# Patient Record
Sex: FEMALE | Race: WHITE | NOT HISPANIC OR LATINO | URBAN - METROPOLITAN AREA
[De-identification: names, ages, dates, MRNs, and addresses within clinical notes are randomized per-mention and may not be internally consistent; named-entity substitution may affect disease eponyms.]

---

## 2020-09-08 ENCOUNTER — OFFICE VISIT (OUTPATIENT)
Dept: URBAN - METROPOLITAN AREA CLINIC 63 | Facility: CLINIC | Age: 60
End: 2020-09-08

## 2020-09-08 ENCOUNTER — OFFICE VISIT (OUTPATIENT)
Dept: URBAN - METROPOLITAN AREA CLINIC 60 | Facility: CLINIC | Age: 60
End: 2020-09-08

## 2021-10-06 ENCOUNTER — OFFICE VISIT (OUTPATIENT)
Dept: URBAN - METROPOLITAN AREA CLINIC 60 | Facility: CLINIC | Age: 61
End: 2021-10-06

## 2022-04-04 ENCOUNTER — LAB OUTSIDE AN ENCOUNTER (OUTPATIENT)
Dept: URBAN - METROPOLITAN AREA CLINIC 121 | Facility: CLINIC | Age: 62
End: 2022-04-04

## 2022-04-06 LAB
ABSOLUTE BASOPHILS: (no result)
ABSOLUTE EOSINOPHILS: (no result)
ABSOLUTE LYMPHOCYTES: (no result)
ABSOLUTE MONOCYTES: (no result)
ABSOLUTE NEUTROPHILS: (no result)
ALBUMIN/GLOBULIN RATIO: (no result)
ALBUMIN: (no result)
ALKALINE PHOSPHATASE: (no result)
ALT: (no result)
AST: (no result)
BASOPHILS: (no result)
BILIRUBIN, TOTAL: (no result)
BUN/CREATININE RATIO: (no result)
CALCIUM: (no result)
CARBON DIOXIDE: (no result)
CHLORIDE: (no result)
CREATININE: (no result)
EGFR AFRICAN AMERIC: (no result)
EGFR NON-AFR. AMERI: (no result)
EOSINOPHILS: (no result)
GLOBULIN: (no result)
GLUCOSE: (no result)
HEMATOCRIT: (no result)
HEMOGLOBIN: (no result)
INR: 1
LYMPHOCYTES: (no result)
MCH: (no result)
MCHC: (no result)
MCV: (no result)
MONOCYTES: (no result)
MPV: (no result)
NEUTROPHILS: (no result)
PLATELET COUNT: (no result)
POTASSIUM: (no result)
PROTEIN, TOTAL: (no result)
PT: (no result)
RDW: (no result)
RED BLOOD CELL COUNT: (no result)
SODIUM: (no result)
UREA NITROGEN (BUN): (no result)
WHITE BLOOD CELL COUNT: (no result)

## 2022-04-20 ENCOUNTER — OFFICE VISIT (OUTPATIENT)
Dept: URBAN - METROPOLITAN AREA CLINIC 60 | Facility: CLINIC | Age: 62
End: 2022-04-20

## 2022-07-09 ENCOUNTER — TELEPHONE ENCOUNTER (OUTPATIENT)
Dept: URBAN - METROPOLITAN AREA CLINIC 121 | Facility: CLINIC | Age: 62
End: 2022-07-09

## 2022-07-09 RX ORDER — PANTOPRAZOLE SODIUM 40 MG/1
TABLET, DELAYED RELEASE ORAL ONCE A DAY
Refills: 0 | OUTPATIENT
Start: 2017-12-19 | End: 2017-12-19

## 2022-07-09 RX ORDER — GINGER ROOT/GINGER ROOT EXT 262.5 MG
CAPSULE ORAL
Refills: 0 | OUTPATIENT
Start: 2016-12-07 | End: 2017-06-27

## 2022-07-09 RX ORDER — SODIUM SULFATE, POTASSIUM SULFATE, MAGNESIUM SULFATE 17.5; 3.13; 1.6 G/ML; G/ML; G/ML
SOLUTION, CONCENTRATE ORAL
Refills: 0 | OUTPATIENT
Start: 2014-03-26 | End: 2018-03-14

## 2022-07-09 RX ORDER — GINGER ROOT/GINGER ROOT EXT 262.5 MG
CAPSULE ORAL ONCE A DAY
Refills: 0 | OUTPATIENT
Start: 2017-12-19 | End: 2018-03-14

## 2022-07-09 RX ORDER — CARVEDILOL 3.12 MG/1
TABLET, FILM COATED ORAL TWICE A DAY
Refills: 0 | OUTPATIENT
Start: 2017-12-19 | End: 2018-03-14

## 2022-07-09 RX ORDER — ESTRADIOL 10 UG/1
INSERT VAGINAL
Refills: 0 | OUTPATIENT
Start: 2011-05-03 | End: 2014-01-29

## 2022-07-09 RX ORDER — PANTOPRAZOLE SODIUM 40 MG/1
TABLET, DELAYED RELEASE ORAL
Refills: 0 | OUTPATIENT
Start: 2013-12-26 | End: 2014-03-26

## 2022-07-09 RX ORDER — ESTRADIOL 10 UG/1
INSERT VAGINAL
Refills: 0 | OUTPATIENT
Start: 2014-03-26 | End: 2014-06-30

## 2022-07-09 RX ORDER — FOLIC ACID 1 MG/1
TABLET ORAL
Refills: 0 | OUTPATIENT
Start: 2014-06-30 | End: 2016-05-18

## 2022-07-09 RX ORDER — PANTOPRAZOLE SODIUM 40 MG/1
TABLET, DELAYED RELEASE ORAL ONCE A DAY
Refills: 0 | OUTPATIENT
Start: 2016-12-07 | End: 2017-06-27

## 2022-07-09 RX ORDER — CALCIUM NO.38/D3/MAG/BORON ASP 500MG/15ML
LIQUID (ML) ORAL ONCE A DAY
Refills: 0 | OUTPATIENT
Start: 2016-12-07 | End: 2016-12-07

## 2022-07-09 RX ORDER — PANTOPRAZOLE SODIUM 40 MG/1
TABLET, DELAYED RELEASE ORAL
Refills: 0 | OUTPATIENT
Start: 2014-03-26 | End: 2014-06-30

## 2022-07-09 RX ORDER — FOLIC ACID 1 MG/1
TABLET ORAL
Refills: 0 | OUTPATIENT
Start: 2014-01-02 | End: 2014-03-26

## 2022-07-09 RX ORDER — BENAZEPRIL HYDROCHLORIDE 10 MG/1
TABLET, COATED ORAL
Refills: 0 | OUTPATIENT
Start: 2014-03-26 | End: 2014-03-26

## 2022-07-09 RX ORDER — AMITRIPTYLINE HYDROCHLORIDE 25 MG/1
TABLET, FILM COATED ORAL
Refills: 0 | OUTPATIENT
Start: 2014-01-24 | End: 2014-03-26

## 2022-07-09 RX ORDER — BENAZEPRIL HYDROCHLORIDE 20 MG/1
TABLET, FILM COATED ORAL
Refills: 0 | OUTPATIENT
Start: 2014-06-05 | End: 2015-07-29

## 2022-07-09 RX ORDER — CARVEDILOL 3.12 MG/1
TABLET, FILM COATED ORAL ONCE A DAY
Refills: 0 | OUTPATIENT
Start: 2016-05-18 | End: 2016-12-07

## 2022-07-09 RX ORDER — SPIRONOLACTONE 25 MG/1
TABLET ORAL ONCE A DAY
Refills: 0 | OUTPATIENT
Start: 2015-07-29 | End: 2016-12-07

## 2022-07-09 RX ORDER — AMOXICILLIN 500 MG/1
CAPSULE ORAL
Refills: 0 | OUTPATIENT
Start: 2017-12-19 | End: 2018-03-14

## 2022-07-09 RX ORDER — FOLIC ACID 1 MG/1
TABLET ORAL
Refills: 0 | OUTPATIENT
Start: 2011-05-03 | End: 2014-01-29

## 2022-07-09 RX ORDER — AMITRIPTYLINE HYDROCHLORIDE 25 MG/1
TABLET, FILM COATED ORAL
Refills: 0 | OUTPATIENT
Start: 2017-12-19 | End: 2018-03-14

## 2022-07-09 RX ORDER — PANTOPRAZOLE SODIUM 40 MG/1
TABLET, DELAYED RELEASE ORAL ONCE A DAY
Refills: 0 | OUTPATIENT
Start: 2017-06-27 | End: 2017-12-19

## 2022-07-09 RX ORDER — LISINOPRIL 2.5 MG/1
TABLET ORAL
Refills: 0 | OUTPATIENT
Start: 2017-06-27 | End: 2017-12-19

## 2022-07-09 RX ORDER — FOLIC ACID 1 MG/1
TABLET ORAL
Refills: 0 | OUTPATIENT
Start: 2014-03-26 | End: 2014-06-30

## 2022-07-09 RX ORDER — AMITRIPTYLINE HYDROCHLORIDE 25 MG/1
TABLET, FILM COATED ORAL
Refills: 0 | OUTPATIENT
Start: 2017-04-03 | End: 2017-06-27

## 2022-07-09 RX ORDER — FERROUS SULFATE TAB EC 325 MG (65 MG FE EQUIVALENT) 325 (65 FE) MG
TABLET DELAYED RESPONSE ORAL
Refills: 0 | OUTPATIENT
Start: 2011-05-03 | End: 2014-01-29

## 2022-07-09 RX ORDER — CARVEDILOL 3.12 MG/1
TABLET, FILM COATED ORAL ONCE A DAY
Refills: 0 | OUTPATIENT
Start: 2015-07-29 | End: 2016-05-18

## 2022-07-09 RX ORDER — BENAZEPRIL HYDROCHLORIDE AND HYDROCHLOROTHIAZIDE 20; 12.5 MG/1; MG/1
TABLET, FILM COATED ORAL
Refills: 0 | OUTPATIENT
Start: 2011-05-03 | End: 2012-02-08

## 2022-07-09 RX ORDER — BENAZEPRIL HYDROCHLORIDE 10 MG/1
TABLET, COATED ORAL
Refills: 0 | OUTPATIENT
Start: 2014-03-26 | End: 2014-06-30

## 2022-07-09 RX ORDER — ALENDRONATE SODIUM 70 MG/1
ONCE A WEEK TABLET ORAL
Refills: 0 | OUTPATIENT
Start: 2016-12-07 | End: 2017-06-27

## 2022-07-09 RX ORDER — PANTOPRAZOLE SODIUM 40 MG/1
TABLET, DELAYED RELEASE ORAL ONCE A DAY
Refills: 0 | OUTPATIENT
Start: 2016-05-18 | End: 2016-12-07

## 2022-07-09 RX ORDER — AMITRIPTYLINE HYDROCHLORIDE 25 MG/1
TABLET, FILM COATED ORAL AS NEEDED
Refills: 0 | OUTPATIENT
Start: 2017-06-27 | End: 2017-12-19

## 2022-07-09 RX ORDER — PANTOPRAZOLE SODIUM 40 MG/1
TABLET, DELAYED RELEASE ORAL
Refills: 0 | OUTPATIENT
Start: 2014-06-30 | End: 2016-05-18

## 2022-07-09 RX ORDER — ESTRADIOL 10 UG/1
INSERT VAGINAL
Refills: 0 | OUTPATIENT
Start: 2014-06-30 | End: 2016-12-07

## 2022-07-09 RX ORDER — LEVOTHYROXINE SODIUM 25 UG/1
TABLET ORAL ONCE A DAY
Refills: 0 | OUTPATIENT
Start: 2017-06-27 | End: 2017-12-19

## 2022-07-09 RX ORDER — FUROSEMIDE 20 MG/1
TABLET ORAL
Refills: 0 | OUTPATIENT
Start: 2014-03-26 | End: 2014-06-30

## 2022-07-09 RX ORDER — GABAPENTIN 300 MG/1
CAPSULE ORAL TWICE A DAY
Refills: 0 | OUTPATIENT
Start: 2016-05-18 | End: 2016-12-07

## 2022-07-09 RX ORDER — AMOXICILLIN 500 MG/1
CAPSULE ORAL
Refills: 0 | OUTPATIENT
Start: 2017-06-27 | End: 2017-12-19

## 2022-07-09 RX ORDER — AMITRIPTYLINE HYDROCHLORIDE 25 MG/1
TABLET, FILM COATED ORAL
Refills: 0 | OUTPATIENT
Start: 2014-06-30 | End: 2015-07-29

## 2022-07-09 RX ORDER — CARVEDILOL 3.12 MG/1
TABLET, FILM COATED ORAL TWICE A DAY
Refills: 0 | OUTPATIENT
Start: 2017-06-27 | End: 2017-12-19

## 2022-07-09 RX ORDER — AMITRIPTYLINE HYDROCHLORIDE 25 MG/1
TABLET, FILM COATED ORAL
Refills: 0 | OUTPATIENT
Start: 2014-03-26 | End: 2014-06-30

## 2022-07-09 RX ORDER — LISINOPRIL 2.5 MG/1
TABLET ORAL
Refills: 0 | OUTPATIENT
Start: 2016-12-07 | End: 2017-06-27

## 2022-07-09 RX ORDER — CALCIUM NO.38/D3/MAG/BORON ASP 500MG/15ML
LIQUID (ML) ORAL
Refills: 0 | OUTPATIENT
Start: 2011-05-03 | End: 2014-01-29

## 2022-07-09 RX ORDER — PAROXETINE HYDROCHLORIDE HEMIHYDRATE 20 MG/1
TABLET, FILM COATED ORAL ONCE A DAY
Refills: 0 | OUTPATIENT
Start: 2015-07-29 | End: 2016-12-07

## 2022-07-09 RX ORDER — ALENDRONATE SODIUM 70 MG/1
ONCE A WK TABLET ORAL
Refills: 0 | OUTPATIENT
Start: 2017-06-27 | End: 2017-12-19

## 2022-07-09 RX ORDER — SERTRALINE 100 MG/1
TABLET, FILM COATED ORAL TWICE A DAY
Refills: 0 | OUTPATIENT
Start: 2016-05-18 | End: 2016-12-07

## 2022-07-09 RX ORDER — LISINOPRIL 2.5 MG/1
TABLET ORAL TAKE AS DIRECTED
Refills: 0 | OUTPATIENT
Start: 2015-07-29 | End: 2016-05-18

## 2022-07-09 RX ORDER — CALCIUM NO.38/D3/MAG/BORON ASP 500MG/15ML
LIQUID (ML) ORAL
Refills: 0 | OUTPATIENT
Start: 2014-06-30 | End: 2016-05-18

## 2022-07-09 RX ORDER — GINGER ROOT/GINGER ROOT EXT 262.5 MG
CAPSULE ORAL ONCE A DAY
Refills: 0 | OUTPATIENT
Start: 2017-06-27 | End: 2017-12-19

## 2022-07-09 RX ORDER — FOLIC ACID 1 MG/1
TABLET ORAL ONCE A DAY
Refills: 0 | OUTPATIENT
Start: 2016-05-18 | End: 2016-12-07

## 2022-07-09 RX ORDER — LISINOPRIL 2.5 MG/1
TABLET ORAL
Refills: 0 | OUTPATIENT
Start: 2016-05-18 | End: 2016-12-07

## 2022-07-09 RX ORDER — MULTIVIT WITH CALCIUM,IRON,MIN
TABLET ORAL
Refills: 0 | OUTPATIENT
Start: 2011-05-03 | End: 2014-01-29

## 2022-07-09 RX ORDER — BENAZEPRIL HYDROCHLORIDE 10 MG/1
TABLET, COATED ORAL
Refills: 0 | OUTPATIENT
Start: 2014-01-02 | End: 2014-03-26

## 2022-07-09 RX ORDER — CALCIUM NO.38/D3/MAG/BORON ASP 500MG/15ML
LIQUID (ML) ORAL
Refills: 0 | OUTPATIENT
Start: 2014-03-26 | End: 2014-06-30

## 2022-07-09 RX ORDER — ALENDRONATE SODIUM 70 MG/1
ONCE A WK TABLET ORAL
Refills: 0 | OUTPATIENT
Start: 2017-12-19 | End: 2018-03-14

## 2022-07-09 RX ORDER — CALCIUM NO.38/D3/MAG/BORON ASP 500MG/15ML
LIQUID (ML) ORAL
Refills: 0 | OUTPATIENT
Start: 2014-01-29 | End: 2014-03-26

## 2022-07-09 RX ORDER — CARVEDILOL 3.12 MG/1
TABLET, FILM COATED ORAL ONCE A DAY
Refills: 0 | OUTPATIENT
Start: 2016-12-07 | End: 2017-06-27

## 2022-07-09 RX ORDER — LEVOTHYROXINE SODIUM 25 UG/1
TABLET ORAL
Refills: 0 | OUTPATIENT
Start: 2016-12-07 | End: 2017-06-27

## 2022-07-09 RX ORDER — LEVOTHYROXINE SODIUM 50 UG/1
TABLET ORAL ONCE A DAY
Refills: 0 | OUTPATIENT
Start: 2017-12-19 | End: 2018-03-14

## 2022-07-09 RX ORDER — LISINOPRIL 2.5 MG/1
TABLET ORAL ONCE A DAY
Refills: 0 | OUTPATIENT
Start: 2017-12-19 | End: 2018-03-14

## 2022-07-09 RX ORDER — FUROSEMIDE 20 MG/1
TABLET ORAL ONCE A DAY
Refills: 0 | OUTPATIENT
Start: 2015-07-29 | End: 2016-12-07

## 2022-07-09 RX ORDER — CALCIUM NO.38/D3/MAG/BORON ASP 500MG/15ML
LIQUID (ML) ORAL ONCE A DAY
Refills: 0 | OUTPATIENT
Start: 2016-05-18 | End: 2016-12-07

## 2022-07-09 RX ORDER — FUROSEMIDE 20 MG/1
TABLET ORAL
Refills: 0 | OUTPATIENT
Start: 2014-01-18 | End: 2014-03-26

## 2022-07-09 RX ORDER — MULTIVITAMIN
TABLET ORAL ONCE A DAY
Refills: 0 | OUTPATIENT
Start: 2016-05-18 | End: 2016-12-07

## 2022-07-09 RX ORDER — LORAZEPAM 1 MG/1
TABLET ORAL
Refills: 0 | OUTPATIENT
Start: 2014-01-11 | End: 2014-01-29

## 2022-07-09 RX ORDER — ESTRADIOL 10 UG/1
INSERT VAGINAL
Refills: 0 | OUTPATIENT
Start: 2014-01-29 | End: 2014-03-26

## 2022-07-10 ENCOUNTER — TELEPHONE ENCOUNTER (OUTPATIENT)
Dept: URBAN - METROPOLITAN AREA CLINIC 121 | Facility: CLINIC | Age: 62
End: 2022-07-10

## 2022-07-10 RX ORDER — GINGER ROOT/GINGER ROOT EXT 262.5 MG
CAPSULE ORAL ONCE A DAY
Refills: 0 | Status: ACTIVE | COMMUNITY
Start: 2018-03-14

## 2022-07-10 RX ORDER — AMITRIPTYLINE HYDROCHLORIDE 25 MG/1
TABLET, FILM COATED ORAL
Refills: 0 | Status: ACTIVE | COMMUNITY
Start: 2018-03-14

## 2022-07-10 RX ORDER — LISINOPRIL 2.5 MG/1
TABLET ORAL ONCE A DAY
Refills: 0 | Status: ACTIVE | COMMUNITY
Start: 2018-03-14

## 2022-07-10 RX ORDER — AMITRIPTYLINE HYDROCHLORIDE 25 MG/1
TABLET, FILM COATED ORAL
Refills: 0 | Status: ACTIVE | COMMUNITY
Start: 2019-08-14

## 2022-07-10 RX ORDER — CARVEDILOL 3.12 MG/1
TABLET, FILM COATED ORAL TWICE A DAY
Refills: 0 | Status: ACTIVE | COMMUNITY
Start: 2018-03-14

## 2022-07-10 RX ORDER — ALENDRONATE SODIUM 70 MG/1
ONCE A WK TABLET ORAL
Refills: 0 | Status: ACTIVE | COMMUNITY
Start: 2018-03-14

## 2022-07-10 RX ORDER — LEVOTHYROXINE SODIUM 50 UG/1
TABLET ORAL ONCE A DAY
Refills: 0 | Status: ACTIVE | COMMUNITY
Start: 2018-03-14

## 2022-07-10 RX ORDER — ONDANSETRON 4 MG/1
USE AS DIRECTED ONE TABLET BEFORE EACH DOSE OF LAXATIVE FOR BOWEL PREP TABLET, ORALLY DISINTEGRATING ORAL
Refills: 0 | Status: ACTIVE | COMMUNITY
Start: 2019-08-14

## 2022-07-10 RX ORDER — FOLIC ACID 1 MG/1
TABLET ORAL
Refills: 0 | Status: ACTIVE | COMMUNITY
Start: 2019-08-14

## 2022-07-10 RX ORDER — AMOXICILLIN 500 MG/1
CAPSULE ORAL
Refills: 0 | Status: ACTIVE | COMMUNITY
Start: 2018-03-14

## 2022-08-29 ENCOUNTER — TELEPHONE ENCOUNTER (OUTPATIENT)
Dept: URBAN - METROPOLITAN AREA CLINIC 23 | Facility: CLINIC | Age: 62
End: 2022-08-29

## 2022-10-26 ENCOUNTER — OFFICE VISIT (OUTPATIENT)
Dept: URBAN - METROPOLITAN AREA CLINIC 60 | Facility: CLINIC | Age: 62
End: 2022-10-26

## 2022-11-23 ENCOUNTER — LAB OUTSIDE AN ENCOUNTER (OUTPATIENT)
Dept: URBAN - METROPOLITAN AREA CLINIC 63 | Facility: CLINIC | Age: 62
End: 2022-11-23

## 2022-11-24 LAB
A/G RATIO: 1.4
ABSOLUTE BASOPHILS: 69
ABSOLUTE EOSINOPHILS: 40
ABSOLUTE LYMPHOCYTES: 739
ABSOLUTE MONOCYTES: 469
ABSOLUTE NEUTROPHILS: 1983
ALBUMIN: 4.3
ALKALINE PHOSPHATASE: 310
ALT (SGPT): 38
AST (SGOT): 97
BASOPHILS: 2.1
BILIRUBIN, TOTAL: 1.1
BUN/CREATININE RATIO: (no result)
BUN: 10
CALCIUM: 9.3
CARBON DIOXIDE, TOTAL: 29
CHLORIDE: 99
COMMENT(S): (no result)
CREATININE: 0.55
EGFR: 102
EOSINOPHILS: 1.2
FERRITIN, SERUM: 460
FOLATE (FOLIC ACID), SERUM: >24
GLOBULIN, TOTAL: 3.1
GLUCOSE: 122
HEMATOCRIT: 34.3
HEMOGLOBIN: 11.8
IRON BIND.CAP.(TIBC): 292
IRON SATURATION: 58
IRON: 168
LYMPHOCYTES: 22.4
MCH: 36.1
MCHC: 34.4
MCV: 104.9
MONOCYTES: 14.2
MPV: 10.6
NEUTROPHILS: 60.1
PLATELET COUNT: 115
POTASSIUM: 4.5
PROTEIN, TOTAL: 7.4
RDW: 11.4
RED BLOOD CELL COUNT: 3.27
SODIUM: 137
VITAMIN B12: 629
WHITE BLOOD CELL COUNT: 3.3

## 2022-11-29 ENCOUNTER — TELEPHONE ENCOUNTER (OUTPATIENT)
Dept: URBAN - METROPOLITAN AREA CLINIC 63 | Facility: CLINIC | Age: 62
End: 2022-11-29

## 2022-11-30 ENCOUNTER — OFFICE VISIT (OUTPATIENT)
Dept: URBAN - METROPOLITAN AREA CLINIC 60 | Facility: CLINIC | Age: 62
End: 2022-11-30
Payer: MEDICARE

## 2022-11-30 VITALS
HEART RATE: 83 BPM | WEIGHT: 117.2 LBS | DIASTOLIC BLOOD PRESSURE: 60 MMHG | SYSTOLIC BLOOD PRESSURE: 114 MMHG | BODY MASS INDEX: 18.84 KG/M2 | HEIGHT: 66 IN | TEMPERATURE: 97.2 F | OXYGEN SATURATION: 98 %

## 2022-11-30 DIAGNOSIS — F10.20 UNCOMPLICATED ALCOHOL DEPENDENCE: ICD-10-CM

## 2022-11-30 DIAGNOSIS — K70.30 ALCOHOLIC CIRRHOSIS OF LIVER WITHOUT ASCITES: ICD-10-CM

## 2022-11-30 PROBLEM — 66590003: Status: ACTIVE | Noted: 2022-11-30

## 2022-11-30 PROCEDURE — 99214 OFFICE O/P EST MOD 30 MIN: CPT | Performed by: INTERNAL MEDICINE

## 2022-11-30 RX ORDER — LEVOTHYROXINE SODIUM 50 UG/1
TABLET ORAL ONCE A DAY
Refills: 0 | COMMUNITY
Start: 2018-03-14

## 2022-11-30 RX ORDER — CARVEDILOL 3.12 MG/1
TABLET, FILM COATED ORAL TWICE A DAY
Refills: 0 | COMMUNITY
Start: 2018-03-14

## 2022-11-30 RX ORDER — GINGER ROOT/GINGER ROOT EXT 262.5 MG
CAPSULE ORAL ONCE A DAY
Refills: 0 | COMMUNITY
Start: 2018-03-14

## 2022-11-30 RX ORDER — ALENDRONATE SODIUM 70 MG/1
ONCE A WK TABLET ORAL
Refills: 0 | COMMUNITY
Start: 2018-03-14

## 2022-11-30 RX ORDER — AMITRIPTYLINE HYDROCHLORIDE 25 MG/1
TABLET, FILM COATED ORAL
Refills: 0 | COMMUNITY
Start: 2019-08-14

## 2022-11-30 RX ORDER — FOLIC ACID 1 MG/1
TABLET ORAL
Refills: 0 | COMMUNITY
Start: 2019-08-14

## 2022-11-30 RX ORDER — LISINOPRIL 2.5 MG/1
TABLET ORAL ONCE A DAY
Refills: 0 | COMMUNITY
Start: 2018-03-14

## 2022-11-30 NOTE — HPI-HPI
Pt f/u for etoh probably early cirrhosis and history of etoh pancreatitis. STill drinking against my advice. Recent fibroscan 10/22 S3, F4 worsened. Previous Fibroscan S3, F0-2 dating to 2020. No recent AFP or HHC testing.  Will do triple phase CT. Labs below show AST>ALT consistent with continued etoh abuse up to 1 1/2 liters of wine per day (although she says 3 glasses of wine per day).

## 2022-11-30 NOTE — PHYSICAL EXAM NECK/THYROID:
normal appearance , no deformities , trachea midline , no masses , no JVD , no cervical lymphadenopathy, No carotid bruits Well developed

## 2022-12-07 ENCOUNTER — LAB OUTSIDE AN ENCOUNTER (OUTPATIENT)
Dept: URBAN - METROPOLITAN AREA CLINIC 60 | Facility: CLINIC | Age: 62
End: 2022-12-07

## 2022-12-24 LAB
AFP, SERUM, TUMOR MARKER: 7.2
HEREDITARY HEMOCHROMATOSIS DNA MUT: (no result)

## 2023-01-04 ENCOUNTER — OFFICE VISIT (OUTPATIENT)
Dept: URBAN - METROPOLITAN AREA CLINIC 60 | Facility: CLINIC | Age: 63
End: 2023-01-04
Payer: MEDICARE

## 2023-01-04 VITALS
HEIGHT: 66 IN | DIASTOLIC BLOOD PRESSURE: 76 MMHG | HEART RATE: 77 BPM | BODY MASS INDEX: 18.71 KG/M2 | WEIGHT: 116.4 LBS | TEMPERATURE: 97.8 F | OXYGEN SATURATION: 97 % | SYSTOLIC BLOOD PRESSURE: 122 MMHG

## 2023-01-04 DIAGNOSIS — D69.6 THROMBOCYTOPENIA: ICD-10-CM

## 2023-01-04 DIAGNOSIS — K70.0 ALCOHOLIC FATTY LIVER: ICD-10-CM

## 2023-01-04 DIAGNOSIS — K70.30 ALCOHOLIC CIRRHOSIS OF LIVER WITHOUT ASCITES: ICD-10-CM

## 2023-01-04 DIAGNOSIS — D72.819 LEUKOPENIA, UNSPECIFIED TYPE: ICD-10-CM

## 2023-01-04 PROBLEM — 84828003: Status: ACTIVE | Noted: 2023-01-04

## 2023-01-04 PROBLEM — 302215000: Status: ACTIVE | Noted: 2023-01-04

## 2023-01-04 PROCEDURE — 99214 OFFICE O/P EST MOD 30 MIN: CPT | Performed by: NURSE PRACTITIONER

## 2023-01-04 RX ORDER — ALENDRONATE SODIUM 70 MG/1
ONCE A WK TABLET ORAL
Refills: 0 | COMMUNITY
Start: 2018-03-14

## 2023-01-04 RX ORDER — AMITRIPTYLINE HYDROCHLORIDE 25 MG/1
TABLET, FILM COATED ORAL
Refills: 0 | COMMUNITY
Start: 2019-08-14

## 2023-01-04 RX ORDER — LISINOPRIL 2.5 MG/1
TABLET ORAL ONCE A DAY
Refills: 0 | COMMUNITY
Start: 2018-03-14

## 2023-01-04 RX ORDER — FOLIC ACID 1 MG/1
TABLET ORAL
Refills: 0 | COMMUNITY
Start: 2019-08-14

## 2023-01-04 RX ORDER — GINGER ROOT/GINGER ROOT EXT 262.5 MG
CAPSULE ORAL ONCE A DAY
Refills: 0 | COMMUNITY
Start: 2018-03-14

## 2023-01-04 RX ORDER — LEVOTHYROXINE SODIUM 50 UG/1
TABLET ORAL ONCE A DAY
Refills: 0 | COMMUNITY
Start: 2018-03-14

## 2023-01-04 RX ORDER — CARVEDILOL 3.12 MG/1
TABLET, FILM COATED ORAL TWICE A DAY
Refills: 0 | COMMUNITY
Start: 2018-03-14

## 2023-01-04 NOTE — HPI-CIRRHOSIS
Complications: thrombocytopenia, leukopenia and macrocytosis  Last EGD:   HCC screenin2022 AFP 7.2 2022 ct triple phase shows severe steatosis, widening of interlobar fissure. No maciej cirrhosis. Also notes chronic pancreatitis  Diuretics: none  Alcohol status: drinking 1.5 liter of white wine per day  MELD-Na: 8

## 2023-01-04 NOTE — HPI-TODAY'S VISIT:
History of severe alcohol abuse. Drinking at least 1.5 liters of wine per day, which she refers to as 3 glasses of wine. Has tried AA and inpatient rehab before to no avail. Fibroscan this year showed F4, ct triple phase shows severe steatosis and widening of interlobar fissure. Labs show pancytopenia and macrocytosis, likely bone marrow suppression from alcohol abuse. Taking multivitamin and b vitamin complex Eats 1 meal per day and drinks boost

## 2023-02-16 ENCOUNTER — OFFICE VISIT (OUTPATIENT)
Dept: URBAN - METROPOLITAN AREA CLINIC 63 | Facility: CLINIC | Age: 63
End: 2023-02-16

## 2023-02-16 RX ORDER — AMITRIPTYLINE HYDROCHLORIDE 25 MG/1
TABLET, FILM COATED ORAL
Refills: 0 | Status: ACTIVE | COMMUNITY
Start: 2019-08-14

## 2023-02-16 RX ORDER — FOLIC ACID 1 MG/1
TABLET ORAL
Refills: 0 | Status: ACTIVE | COMMUNITY
Start: 2019-08-14

## 2023-02-16 RX ORDER — GINGER ROOT/GINGER ROOT EXT 262.5 MG
CAPSULE ORAL ONCE A DAY
Refills: 0 | Status: ACTIVE | COMMUNITY
Start: 2018-03-14

## 2023-02-16 RX ORDER — CARVEDILOL 3.12 MG/1
TABLET, FILM COATED ORAL TWICE A DAY
Refills: 0 | Status: ACTIVE | COMMUNITY
Start: 2018-03-14

## 2023-02-16 RX ORDER — LEVOTHYROXINE SODIUM 50 UG/1
TABLET ORAL ONCE A DAY
Refills: 0 | Status: ACTIVE | COMMUNITY
Start: 2018-03-14

## 2023-02-16 RX ORDER — ALENDRONATE SODIUM 70 MG/1
ONCE A WK TABLET ORAL
Refills: 0 | Status: ACTIVE | COMMUNITY
Start: 2018-03-14

## 2023-02-16 RX ORDER — LISINOPRIL 2.5 MG/1
TABLET ORAL ONCE A DAY
Refills: 0 | Status: ACTIVE | COMMUNITY
Start: 2018-03-14

## 2023-03-29 ENCOUNTER — WEB ENCOUNTER (OUTPATIENT)
Dept: URBAN - METROPOLITAN AREA CLINIC 60 | Facility: CLINIC | Age: 63
End: 2023-03-29

## 2023-03-29 ENCOUNTER — LAB OUTSIDE AN ENCOUNTER (OUTPATIENT)
Dept: URBAN - METROPOLITAN AREA CLINIC 60 | Facility: CLINIC | Age: 63
End: 2023-03-29

## 2023-03-29 ENCOUNTER — OFFICE VISIT (OUTPATIENT)
Dept: URBAN - METROPOLITAN AREA CLINIC 60 | Facility: CLINIC | Age: 63
End: 2023-03-29
Payer: MEDICARE

## 2023-03-29 VITALS
HEART RATE: 76 BPM | WEIGHT: 118.2 LBS | TEMPERATURE: 98.1 F | SYSTOLIC BLOOD PRESSURE: 124 MMHG | OXYGEN SATURATION: 96 % | BODY MASS INDEX: 18.99 KG/M2 | DIASTOLIC BLOOD PRESSURE: 78 MMHG | HEIGHT: 66 IN

## 2023-03-29 DIAGNOSIS — K70.9 ALCOHOLIC LIVER DISEASE: ICD-10-CM

## 2023-03-29 PROBLEM — 41309000: Status: ACTIVE | Noted: 2023-03-29

## 2023-03-29 PROCEDURE — 99214 OFFICE O/P EST MOD 30 MIN: CPT | Performed by: INTERNAL MEDICINE

## 2023-03-29 RX ORDER — LISINOPRIL 2.5 MG/1
TAKE 1 TABLET BY MOUTH EVERY DAY TABLET ORAL
Qty: 90 EACH | Refills: 0 | Status: ACTIVE | COMMUNITY

## 2023-03-29 RX ORDER — LEVOTHYROXINE SODIUM 0.05 MG/1
TABLET ORAL
Qty: 90 TABLET | Refills: 0 | Status: ACTIVE | COMMUNITY

## 2023-03-29 RX ORDER — ALENDRONATE SODIUM 70 MG/1
TAKE 1 TABLET BY MOUTH ONCE WEEKLY TABLET ORAL
Qty: 12 EACH | Refills: 2 | Status: ACTIVE | COMMUNITY

## 2023-03-29 RX ORDER — CARVEDILOL 3.12 MG/1
TAKE 1 TABLET BY MOUTH TWICE DAILY WITH MEALS TABLET, FILM COATED ORAL
Qty: 180 EACH | Refills: 3 | Status: ACTIVE | COMMUNITY

## 2023-03-29 RX ORDER — AMITRIPTYLINE HYDROCHLORIDE 25 MG/1
TAKE 1 TO 2 TABLETS BY MOUTH AT BEDTIME TABLET, FILM COATED ORAL
Qty: 180 EACH | Refills: 2 | Status: ACTIVE | COMMUNITY

## 2023-03-29 NOTE — HPI-TODAY'S VISIT:
This is a 62-year-old female with alcoholic liver disease with advanced fibrosis and may be early cirrhotic changes coming in for follow-up.  She does not have any ascites, hepatic encephalopathy.  Last MRI in November of this year did not show cirrhosis but did show advanced liver disease.  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  Was drinking 1-1/2 L of wine every day and is now to 750 mL of wine on an every day basis which adds to 5 glasses.  She is trying to cut down more on that.  Has tried AA in the past but did not help her much.

## 2023-04-04 ENCOUNTER — TELEPHONE ENCOUNTER (OUTPATIENT)
Dept: URBAN - METROPOLITAN AREA CLINIC 63 | Facility: CLINIC | Age: 63
End: 2023-04-04

## 2023-05-04 ENCOUNTER — TELEPHONE ENCOUNTER (OUTPATIENT)
Dept: URBAN - METROPOLITAN AREA CLINIC 64 | Facility: CLINIC | Age: 63
End: 2023-05-04

## 2023-05-08 ENCOUNTER — OFFICE VISIT (OUTPATIENT)
Dept: URBAN - METROPOLITAN AREA MEDICAL CENTER 3 | Facility: MEDICAL CENTER | Age: 63
End: 2023-05-08

## 2023-07-03 ENCOUNTER — TELEPHONE ENCOUNTER (OUTPATIENT)
Dept: URBAN - METROPOLITAN AREA CLINIC 60 | Facility: CLINIC | Age: 63
End: 2023-07-03

## 2023-07-03 ENCOUNTER — TELEPHONE ENCOUNTER (OUTPATIENT)
Dept: URBAN - METROPOLITAN AREA CLINIC 63 | Facility: CLINIC | Age: 63
End: 2023-07-03

## 2023-07-03 ENCOUNTER — LAB OUTSIDE AN ENCOUNTER (OUTPATIENT)
Dept: URBAN - METROPOLITAN AREA CLINIC 63 | Facility: CLINIC | Age: 63
End: 2023-07-03

## 2023-07-03 ENCOUNTER — WEB ENCOUNTER (OUTPATIENT)
Dept: URBAN - METROPOLITAN AREA CLINIC 63 | Facility: CLINIC | Age: 63
End: 2023-07-03

## 2023-07-03 ENCOUNTER — OFFICE VISIT (OUTPATIENT)
Dept: URBAN - METROPOLITAN AREA MEDICAL CENTER 3 | Facility: MEDICAL CENTER | Age: 63
End: 2023-07-03
Payer: MEDICARE

## 2023-07-03 DIAGNOSIS — K22.10 ESOPHAGEAL ULCER: ICD-10-CM

## 2023-07-03 DIAGNOSIS — I85.00 ESOPHAGEAL VARICES: ICD-10-CM

## 2023-07-03 DIAGNOSIS — K20.90 ESOPHAGITIS: ICD-10-CM

## 2023-07-03 DIAGNOSIS — K25.9 GASTRIC ULCER: ICD-10-CM

## 2023-07-03 PROBLEM — 19943007: Status: ACTIVE | Noted: 2023-07-03

## 2023-07-03 PROCEDURE — 43239 EGD BIOPSY SINGLE/MULTIPLE: CPT | Performed by: INTERNAL MEDICINE

## 2023-07-03 RX ORDER — AMITRIPTYLINE HYDROCHLORIDE 25 MG/1
TAKE 1 TO 2 TABLETS BY MOUTH AT BEDTIME TABLET, FILM COATED ORAL
Qty: 180 EACH | Refills: 2 | Status: ACTIVE | COMMUNITY

## 2023-07-03 RX ORDER — LISINOPRIL 2.5 MG/1
TAKE 1 TABLET BY MOUTH EVERY DAY TABLET ORAL
Qty: 90 EACH | Refills: 0 | Status: ACTIVE | COMMUNITY

## 2023-07-03 RX ORDER — CARVEDILOL 3.12 MG/1
TAKE 1 TABLET BY MOUTH TWICE DAILY WITH MEALS TABLET, FILM COATED ORAL
Qty: 180 EACH | Refills: 3 | Status: ACTIVE | COMMUNITY

## 2023-07-03 RX ORDER — OMEPRAZOLE 40 MG/1
1 CAPSULE 30 MINUTES CAPSULE, DELAYED RELEASE ORAL TWICE A DAY
Qty: 60 | Refills: 3 | OUTPATIENT
Start: 2023-07-03

## 2023-07-03 RX ORDER — ALENDRONATE SODIUM 70 MG/1
TAKE 1 TABLET BY MOUTH ONCE WEEKLY TABLET ORAL
Qty: 12 EACH | Refills: 2 | Status: ACTIVE | COMMUNITY

## 2023-07-03 RX ORDER — LEVOTHYROXINE SODIUM 0.05 MG/1
TABLET ORAL
Qty: 90 TABLET | Refills: 0 | Status: ACTIVE | COMMUNITY

## 2023-07-31 ENCOUNTER — OFFICE VISIT (OUTPATIENT)
Dept: URBAN - METROPOLITAN AREA CLINIC 60 | Facility: CLINIC | Age: 63
End: 2023-07-31

## 2023-08-02 ENCOUNTER — OFFICE VISIT (OUTPATIENT)
Dept: URBAN - METROPOLITAN AREA CLINIC 60 | Facility: CLINIC | Age: 63
End: 2023-08-02
Payer: MEDICARE

## 2023-08-02 VITALS
WEIGHT: 118.2 LBS | TEMPERATURE: 97.7 F | HEART RATE: 74 BPM | BODY MASS INDEX: 18.99 KG/M2 | OXYGEN SATURATION: 97 % | HEIGHT: 66 IN | DIASTOLIC BLOOD PRESSURE: 76 MMHG | RESPIRATION RATE: 12 BRPM | SYSTOLIC BLOOD PRESSURE: 138 MMHG

## 2023-08-02 DIAGNOSIS — R93.89 ABNORMAL FINDING ON IMAGING: ICD-10-CM

## 2023-08-02 DIAGNOSIS — K21.00 GASTROESOPHAGEAL REFLUX DISEASE WITH ESOPHAGITIS WITHOUT HEMORRHAGE: ICD-10-CM

## 2023-08-02 DIAGNOSIS — K27.9 PEPTIC ULCER DISEASE: ICD-10-CM

## 2023-08-02 DIAGNOSIS — K70.30 ALCOHOLIC CIRRHOSIS, UNSPECIFIED WHETHER ASCITES PRESENT: ICD-10-CM

## 2023-08-02 DIAGNOSIS — K86.1 CHRONIC PANCREATITIS, UNSPECIFIED PANCREATITIS TYPE: ICD-10-CM

## 2023-08-02 PROBLEM — 420054005: Status: ACTIVE | Noted: 2023-08-02

## 2023-08-02 PROBLEM — 13200003: Status: ACTIVE | Noted: 2023-08-02

## 2023-08-02 PROCEDURE — 99214 OFFICE O/P EST MOD 30 MIN: CPT | Performed by: INTERNAL MEDICINE

## 2023-08-02 RX ORDER — LISINOPRIL 2.5 MG/1
TAKE 1 TABLET BY MOUTH EVERY DAY TABLET ORAL
Qty: 90 EACH | Refills: 0 | Status: ACTIVE | COMMUNITY

## 2023-08-02 RX ORDER — OMEPRAZOLE 40 MG/1
1 CAPSULE 30 MINUTES CAPSULE, DELAYED RELEASE ORAL TWICE A DAY
Qty: 60 | Refills: 3 | Status: ACTIVE | COMMUNITY
Start: 2023-07-03

## 2023-08-02 RX ORDER — CARVEDILOL 3.12 MG/1
TAKE 1 TABLET BY MOUTH TWICE DAILY WITH MEALS TABLET, FILM COATED ORAL
Qty: 180 EACH | Refills: 3 | Status: ACTIVE | COMMUNITY

## 2023-08-02 RX ORDER — AMITRIPTYLINE HYDROCHLORIDE 25 MG/1
TAKE 1 TO 2 TABLETS BY MOUTH AT BEDTIME TABLET, FILM COATED ORAL
Qty: 180 EACH | Refills: 2 | Status: ACTIVE | COMMUNITY

## 2023-08-02 RX ORDER — ALENDRONATE SODIUM 70 MG/1
TAKE 1 TABLET BY MOUTH ONCE WEEKLY TABLET ORAL
Qty: 12 EACH | Refills: 2 | Status: ACTIVE | COMMUNITY

## 2023-08-02 RX ORDER — LEVOTHYROXINE SODIUM 0.05 MG/1
TABLET ORAL
Qty: 90 TABLET | Refills: 0 | Status: ACTIVE | COMMUNITY

## 2023-08-02 RX ORDER — OMEPRAZOLE 40 MG/1
1 CAPSULE 30 MINUTES CAPSULE, DELAYED RELEASE ORAL TWICE A DAY
Qty: 90 | Refills: 0
Start: 2023-07-03

## 2023-08-02 NOTE — HPI-TODAY'S VISIT:
Yvette is a 63 old female who presents today for follow-up after EGD.  Full report is summarized below.  EGD revealed esophagitis, esophageal ulcer, small non-bleeding esopahgeal varices, and gastric ulcers. She previouslly followed in our office with Dr Diggs and then Dr Ortiz.  EGD 07/03/2023:Nonbleeding esophageal varices, esophagitis in the distal esophagus and at the gastroesophageal junction, shallow ulcer at the gastroesophageal junction, mild portal hypertensive gastropathy, a few clean-based ulcers in the antrum and prepyloric region, several ulcerations and erosions in the duodenal bulb and duodenal sweep. Colonoscopy 11/2019 with Dr. Diggs:There was a small polyp at the splenic flexure.  There were grade 1 internal hemorrhoids.  Colonoscopy was otherwise unremarkable with normal-appearing colon and terminal ileum. MRI abdomen 07/14/2023: Hepatic steatosis.  Fibrotic/cirrhotic appearing liver.  No findings suggestive of hepatocellular carcinoma.  There was mild periportal edema and nonspecific findings that can sometimes be associated with primary biliary portal vein was patent.  Gallbladder and biliary tree within normal limits.  Spleen appeared normal.  Pancreas appeared atrophic.  There was abnormally dilated and beaded appearance of the pancreatic duct in the neck of the pancreas measuring up to 6 mm.  There were a few small madeline hepatis lymph nodes.

## 2023-08-04 LAB — BILIRUBIN, DIRECT: 0.4

## 2023-08-10 LAB
% SATURATION: 53
A/G RATIO: 1.4
AFP, SERUM, TUMOR MARKER: 8.7
ALBUMIN: 5
ALKALINE PHOSPHATASE: 380
ALT (SGPT): 56
AST (SGOT): 125
BILIRUBIN, TOTAL: 1.3
BUN/CREATININE RATIO: (no result)
BUN: 13
CALCIUM: 9.8
CARBON DIOXIDE, TOTAL: 30
CHLORIDE: 94
CREATININE: 0.6
EGFR: 100
FERRITIN: 589
GLOBULIN, TOTAL: 3.5
GLUCOSE: 117
HEMATOCRIT: 33.6
HEMOGLOBIN: 11.3
IRON BINDING CAPACITY: 349
IRON, TOTAL: 186
MCH: 33.1
MCHC: 33.6
MCV: 98.5
MITOCHONDRIAL (M2) ANTIBODY: <=20
MPV: 11.3
PLATELET COUNT: 82
POTASSIUM: 4.7
PROTEIN, TOTAL: 8.5
RDW: 11.1
RED BLOOD CELL COUNT: 3.41
SODIUM: 135
WHITE BLOOD CELL COUNT: 3.3

## 2023-08-13 PROBLEM — 408574004: Status: ACTIVE | Noted: 2023-08-13

## 2023-08-13 PROBLEM — 235494005: Status: ACTIVE | Noted: 2023-08-13

## 2023-08-13 PROBLEM — 266433003: Status: ACTIVE | Noted: 2023-08-13

## 2023-10-16 ENCOUNTER — ERX REFILL RESPONSE (OUTPATIENT)
Dept: URBAN - METROPOLITAN AREA CLINIC 60 | Facility: CLINIC | Age: 63
End: 2023-10-16

## 2023-10-16 RX ORDER — OMEPRAZOLE 40 MG/1
1 CAPSULE 30 MINUTES CAPSULE, DELAYED RELEASE ORAL TWICE A DAY
Qty: 90 | Refills: 0 | OUTPATIENT

## 2023-10-16 RX ORDER — OMEPRAZOLE 40 MG/1
TAKE 1 CAPSULE BY MOUTH TWICE A DAY 30 MINS BEFORE MEALS CAPSULE, DELAYED RELEASE ORAL
Qty: 60 CAPSULE | Refills: 3 | OUTPATIENT

## 2023-11-01 ENCOUNTER — LAB OUTSIDE AN ENCOUNTER (OUTPATIENT)
Dept: URBAN - METROPOLITAN AREA CLINIC 60 | Facility: CLINIC | Age: 63
End: 2023-11-01

## 2023-11-01 ENCOUNTER — OFFICE VISIT (OUTPATIENT)
Dept: URBAN - METROPOLITAN AREA CLINIC 60 | Facility: CLINIC | Age: 63
End: 2023-11-01
Payer: MEDICARE

## 2023-11-01 VITALS
OXYGEN SATURATION: 97 % | SYSTOLIC BLOOD PRESSURE: 126 MMHG | DIASTOLIC BLOOD PRESSURE: 80 MMHG | BODY MASS INDEX: 18.99 KG/M2 | TEMPERATURE: 97.4 F | HEIGHT: 66 IN | RESPIRATION RATE: 12 BRPM | HEART RATE: 95 BPM | WEIGHT: 118.2 LBS

## 2023-11-01 DIAGNOSIS — K70.30 ALCOHOLIC CIRRHOSIS, UNSPECIFIED WHETHER ASCITES PRESENT: ICD-10-CM

## 2023-11-01 DIAGNOSIS — K27.9 PEPTIC ULCER DISEASE: ICD-10-CM

## 2023-11-01 DIAGNOSIS — K21.9 GASTROESOPHAGEAL REFLUX DISEASE, UNSPECIFIED WHETHER ESOPHAGITIS PRESENT: ICD-10-CM

## 2023-11-01 DIAGNOSIS — K86.1 CHRONIC PANCREATITIS, UNSPECIFIED PANCREATITIS TYPE: ICD-10-CM

## 2023-11-01 PROBLEM — 235595009: Status: ACTIVE | Noted: 2023-11-01

## 2023-11-01 PROCEDURE — 99214 OFFICE O/P EST MOD 30 MIN: CPT | Performed by: INTERNAL MEDICINE

## 2023-11-01 RX ORDER — AMOXICILLIN 500 MG/1
1 CAPSULE CAPSULE ORAL
Status: ACTIVE | COMMUNITY

## 2023-11-01 RX ORDER — FOLIC ACID 1 MG/1
1 TABLET TABLET ORAL ONCE A DAY
Status: ACTIVE | COMMUNITY

## 2023-11-01 RX ORDER — OMEPRAZOLE 40 MG/1
TAKE 1 CAPSULE BY MOUTH TWICE A DAY 30 MINS BEFORE MEALS CAPSULE, DELAYED RELEASE ORAL
Qty: 60 CAPSULE | Refills: 3 | Status: ACTIVE | COMMUNITY

## 2023-11-01 RX ORDER — CARVEDILOL 3.12 MG/1
TAKE 1 TABLET BY MOUTH TWICE DAILY WITH MEALS TABLET, FILM COATED ORAL
Qty: 180 EACH | Refills: 3 | Status: ACTIVE | COMMUNITY

## 2023-11-01 RX ORDER — AMITRIPTYLINE HYDROCHLORIDE 25 MG/1
TAKE 1 TO 2 TABLETS BY MOUTH AT BEDTIME TABLET, FILM COATED ORAL
Qty: 180 EACH | Refills: 2 | Status: ACTIVE | COMMUNITY

## 2023-11-01 RX ORDER — LISINOPRIL 2.5 MG/1
TAKE 1 TABLET BY MOUTH EVERY DAY TABLET ORAL
Qty: 90 EACH | Refills: 0 | Status: ACTIVE | COMMUNITY

## 2023-11-01 RX ORDER — LEVOTHYROXINE SODIUM 0.05 MG/1
TABLET ORAL
Qty: 90 TABLET | Refills: 0 | Status: ACTIVE | COMMUNITY

## 2023-11-01 RX ORDER — BUSPIRONE HYDROCHLORIDE 7.5 MG/1
1 TABLET TABLET ORAL TWICE A DAY
Status: ACTIVE | COMMUNITY

## 2023-11-01 RX ORDER — ALENDRONATE SODIUM 70 MG/1
TAKE 1 TABLET BY MOUTH ONCE WEEKLY TABLET ORAL
Qty: 12 EACH | Refills: 2 | Status: ACTIVE | COMMUNITY

## 2023-11-01 NOTE — HPI-TODAY'S VISIT:
Yvette is a 63-year-old female that presents today for follow-up.  GI history significant for cirrhosis, chronic pancreatitis, GERD, peptic ulcer disease, colon polyps. In the office today, she complains of abdominal distention/bloating. She has some generalized abdominal dicomfort. Denies nausea or vomiting. Denies hematemesis. Denies blood in tsool, melena, hematochezia.  EGD 07/03/2023: Nonbleeding esophageal varices, esophagitis in the distal esophagus and at the gastroesophageal junction, shallow ulcer at the gastroesophageal junction, mild portal hypertensive gastropathy, a few clean-based ulcers in the antrum and prepyloric region, several ulcerations and erosions in the duodenal bulb and duodenal sweep. Colonoscopy 11/2019 with Dr. Diggs: There was a small polyp at the splenic flexure.  There were grade 1 internal hemorrhoids.  Colonoscopy was otherwise unremarkable with normal-appearing colon and terminal ileum. MRI abdomen 07/14/2023: Hepatic steatosis.  Fibrotic/cirrhotic appearing liver.  No findings suggestive of hepatocellular carcinoma.  There was mild periportal edema and nonspecific findings biliary cholangitis. Portal vein was patent.  Gallbladder and biliary tree within normal limits.  Spleen appeared normal.  Pancreas appeared atrophic.  There was normally dilated and beaded appearance of the pancreatic duct in the neck of the pancreas measuring up to 6 mm.  There were a few small madeline hepatis lymph nodes. Labs 08/03/2023:Bilirubin 1.3, alkaline phosphatase 380, , ALT 56, albumin 5.0, ferritin 589, iron saturation 53, WBC 3.3, hemoglobin 11.3, MCV 98, platelet 82, AFP 8.

## 2023-11-29 ENCOUNTER — TELEPHONE ENCOUNTER (OUTPATIENT)
Dept: URBAN - METROPOLITAN AREA CLINIC 63 | Facility: CLINIC | Age: 63
End: 2023-11-29

## 2023-11-29 LAB
% SATURATION: 64
A/G RATIO: 0.9
AFP, SERUM, TUMOR MARKER: 3.9
ALBUMIN: 3.7
ALKALINE PHOSPHATASE: 866
ALT (SGPT): 26
AST (SGOT): 134
BILIRUBIN, TOTAL: 3
BUN/CREATININE RATIO: 20
BUN: 9
CALCIUM: 8.8
CARBON DIOXIDE, TOTAL: 28
CHLORIDE: 97
CREATININE: 0.45
EGFR: 107
FERRITIN: 922
GLOBULIN, TOTAL: 4.3
GLUCOSE: 88
HEMATOCRIT: 33.1
HEMOGLOBIN: 11.8
INR: 1.2
IRON BINDING CAPACITY: 194
IRON, TOTAL: 125
MCH: 36.1
MCHC: 35.6
MCV: 101.2
MPV: 11.1
PLATELET COUNT: 62
POTASSIUM: 4
PROTEIN, TOTAL: 8
PT: 12
RDW: 11.6
RED BLOOD CELL COUNT: 3.27
SODIUM: 136
WHITE BLOOD CELL COUNT: 5.9

## 2023-12-01 ENCOUNTER — TELEPHONE ENCOUNTER (OUTPATIENT)
Dept: URBAN - METROPOLITAN AREA CLINIC 63 | Facility: CLINIC | Age: 63
End: 2023-12-01

## 2023-12-07 PROBLEM — 43797002: Status: ACTIVE | Noted: 2023-12-07

## 2023-12-15 ENCOUNTER — OFFICE VISIT (OUTPATIENT)
Dept: URBAN - METROPOLITAN AREA CLINIC 60 | Facility: CLINIC | Age: 63
End: 2023-12-15
Payer: MEDICARE

## 2023-12-15 ENCOUNTER — LAB OUTSIDE AN ENCOUNTER (OUTPATIENT)
Dept: URBAN - METROPOLITAN AREA CLINIC 60 | Facility: CLINIC | Age: 63
End: 2023-12-15

## 2023-12-15 VITALS
SYSTOLIC BLOOD PRESSURE: 138 MMHG | DIASTOLIC BLOOD PRESSURE: 66 MMHG | BODY MASS INDEX: 19.61 KG/M2 | OXYGEN SATURATION: 97 % | RESPIRATION RATE: 12 BRPM | HEART RATE: 78 BPM | WEIGHT: 122 LBS | TEMPERATURE: 98.4 F | HEIGHT: 66 IN

## 2023-12-15 DIAGNOSIS — K70.30 ALCOHOLIC CIRRHOSIS, UNSPECIFIED WHETHER ASCITES PRESENT: ICD-10-CM

## 2023-12-15 DIAGNOSIS — K86.1 CHRONIC PANCREATITIS, UNSPECIFIED PANCREATITIS TYPE: ICD-10-CM

## 2023-12-15 DIAGNOSIS — K70.31 ASCITES DUE TO ALCOHOLIC CIRRHOSIS: ICD-10-CM

## 2023-12-15 DIAGNOSIS — K83.1 BILE DUCT STRICTURE: ICD-10-CM

## 2023-12-15 PROBLEM — 1082601000119104: Status: ACTIVE | Noted: 2023-12-15

## 2023-12-15 PROCEDURE — 99214 OFFICE O/P EST MOD 30 MIN: CPT | Performed by: PHYSICIAN ASSISTANT

## 2023-12-15 RX ORDER — ALENDRONATE SODIUM 70 MG/1
TAKE 1 TABLET BY MOUTH ONCE WEEKLY TABLET ORAL
Qty: 12 EACH | Refills: 2 | Status: ACTIVE | COMMUNITY

## 2023-12-15 RX ORDER — SPIRONOLACTONE 50 MG/1
1 TABLET TABLET, FILM COATED ORAL ONCE A DAY
Qty: 30 | Refills: 1 | OUTPATIENT
Start: 2023-12-15 | End: 2024-02-13

## 2023-12-15 RX ORDER — OMEPRAZOLE 40 MG/1
TAKE 1 CAPSULE BY MOUTH TWICE A DAY 30 MINS BEFORE MEALS CAPSULE, DELAYED RELEASE ORAL
Qty: 60 CAPSULE | Refills: 3 | Status: ACTIVE | COMMUNITY

## 2023-12-15 RX ORDER — CARVEDILOL 3.12 MG/1
TAKE 1 TABLET BY MOUTH TWICE DAILY WITH MEALS TABLET, FILM COATED ORAL
Qty: 180 EACH | Refills: 3 | Status: ACTIVE | COMMUNITY

## 2023-12-15 RX ORDER — FUROSEMIDE 20 MG/1
1 TABLET TABLET ORAL ONCE A DAY
Qty: 30 | Refills: 1 | OUTPATIENT
Start: 2023-12-15

## 2023-12-15 RX ORDER — AMOXICILLIN 500 MG/1
1 CAPSULE CAPSULE ORAL
Status: ACTIVE | COMMUNITY

## 2023-12-15 RX ORDER — FLUOXETINE 20 MG/1
1 CAPSULE CAPSULE ORAL ONCE A DAY
Status: ACTIVE | COMMUNITY

## 2023-12-15 RX ORDER — AMITRIPTYLINE HYDROCHLORIDE 25 MG/1
TAKE 1 TO 2 TABLETS BY MOUTH AT BEDTIME TABLET, FILM COATED ORAL
Qty: 180 EACH | Refills: 2 | Status: ACTIVE | COMMUNITY

## 2023-12-15 RX ORDER — LISINOPRIL 2.5 MG/1
TAKE 1 TABLET BY MOUTH EVERY DAY TABLET ORAL
Qty: 90 EACH | Refills: 0 | Status: ACTIVE | COMMUNITY

## 2023-12-15 RX ORDER — LEVOTHYROXINE SODIUM 0.05 MG/1
TABLET ORAL
Qty: 90 TABLET | Refills: 0 | Status: ACTIVE | COMMUNITY

## 2023-12-15 RX ORDER — FOLIC ACID 1 MG/1
1 TABLET TABLET ORAL ONCE A DAY
Status: ACTIVE | COMMUNITY

## 2023-12-15 NOTE — HPI-TODAY'S VISIT:
63-year-old female with GI history significant for cirrhosis, chronic pancreatitis, GERD, peptic ulcer disease, colon polyps presents for follow-up. She was last seen in the office on November 1, 2023 complaining of abdominal distention/bloating and generalized abdominal discomfort.  She denied any nausea, vomiting, hematemesis, blood in the stool. Labs were done November 28, 2023.  Her CBC demonstrated a hemoglobin 11.8, platelet count 62.  CMP demonstrated an elevation of her alkaline phosphatase to 866, total bilirubin 3.0, , ALT 26, sodium 136, potassium 4.0, BUN 9, creatinine 0.45.  INR 1.2.  Alpha-fetoprotein 3.9.  Serum iron 125, iron saturation 64%, ferritin 922. Abdominal ultrasound was done on November 27, 2023 which demonstrated a cirrhotic appearing liver.  Ascites.  Dilated pancreatic duct to 7 mm. MRI/MRCP was done November 27, 2023 which demonstrated cirrhosis without suspicious liver mass.  Small volume ascites involving all 4 quadrants.  Severe stricturing of the common bile duct and main pancreatic duct in the pancreatic head involving 2 cm segments of both ducts prior to the ampulla.  Degree of stricturing appears to have progressed since July 2023. She was referred to Dr. Berger with [facility] gastroenterology for EUS/ERCP for further evaluation.  She is scheduled to see Dr. Covington's on January 17, 2024. She follows up today reporting increasing abdominal distention over the past couple of weeks. Denies prior paracentesis. She has been drinking 1.5L of wine per day for many years. Yesteday she cut back to a small bottle and she plans to wean herself off of alcohol completely. She has no other GI complaints. Screened negative for viral hepatitis in the past and had testing for HHC which was negative.

## 2023-12-15 NOTE — PHYSICAL EXAM NEUROLOGIC:
oriented to person, place and time , normal sensation , short and long term memory intact no asterixis

## 2023-12-15 NOTE — PHYSICAL EXAM GASTROINTESTINAL
Abdomen , soft, nontender, distended , no guarding or rigidity , no masses palpable , normal bowel sounds , Liver and Spleen , no hepatomegaly present , no hepatosplenomegaly , liver nontender , spleen not palpable  Dr. Suarez is evaluating patient at bedside.

## 2023-12-19 ENCOUNTER — TELEPHONE ENCOUNTER (OUTPATIENT)
Dept: URBAN - METROPOLITAN AREA CLINIC 63 | Facility: CLINIC | Age: 63
End: 2023-12-19

## 2023-12-28 LAB
A/G RATIO: 1
ACTIN (SMOOTH MUSCLE) ANTIBODY (IGG): <20
ALBUMIN: 4
ALKALINE PHOSPHATASE: 644
ALPHA-1-ANTITRYPSIN QN: 149
ALT (SGPT): 27
AMYLASE: 14
ANA SCREEN, IFA: NEGATIVE
AST (SGOT): 104
BILIRUBIN, TOTAL: 3.7
BUN/CREATININE RATIO: (no result)
BUN: 11
CA 19-9: 47
CALCIUM: 8.9
CARBON DIOXIDE, TOTAL: 29
CHLORIDE: 97
CREATININE: 0.56
EGFR: 101
GLOBULIN, TOTAL: 3.9
GLUCOSE: 96
HBSAG SCREEN: (no result)
HEMATOCRIT: 31.8
HEMOGLOBIN: 11.5
HEP A AB, IGM: (no result)
HEP B CORE AB, IGM: (no result)
HEPATITIS C ANTIBODY: (no result)
INR: 1.2
LIPASE: 49
MCH: 36.6
MCHC: 36.2
MCV: 101.3
MITOCHONDRIAL (M2) ANTIBODY: 86.6
MPV: 11.5
PLATELET COUNT: 80
POTASSIUM: 4.3
PROTEIN, TOTAL: 7.9
PT: 12.7
RDW: 12.2
RED BLOOD CELL COUNT: 3.14
SODIUM: 137
WHITE BLOOD CELL COUNT: 5.8

## 2024-01-09 ENCOUNTER — WEB ENCOUNTER (OUTPATIENT)
Dept: URBAN - METROPOLITAN AREA CLINIC 60 | Facility: CLINIC | Age: 64
End: 2024-01-09

## 2024-01-10 PROBLEM — 19943007: Status: ACTIVE | Noted: 2024-01-10

## 2024-01-18 ENCOUNTER — OFFICE VISIT (OUTPATIENT)
Dept: URBAN - METROPOLITAN AREA CLINIC 60 | Facility: CLINIC | Age: 64
End: 2024-01-18
Payer: MEDICARE

## 2024-01-18 ENCOUNTER — DASHBOARD ENCOUNTERS (OUTPATIENT)
Age: 64
End: 2024-01-18

## 2024-01-18 VITALS
DIASTOLIC BLOOD PRESSURE: 66 MMHG | SYSTOLIC BLOOD PRESSURE: 126 MMHG | OXYGEN SATURATION: 99 % | WEIGHT: 108.6 LBS | TEMPERATURE: 98.1 F | HEIGHT: 66 IN | RESPIRATION RATE: 12 BRPM | HEART RATE: 83 BPM | BODY MASS INDEX: 17.45 KG/M2

## 2024-01-18 DIAGNOSIS — K83.1 COMMON BILE DUCT STRICTURE: ICD-10-CM

## 2024-01-18 DIAGNOSIS — K86.1 CHRONIC PANCREATITIS, UNSPECIFIED PANCREATITIS TYPE: ICD-10-CM

## 2024-01-18 DIAGNOSIS — R10.84 GENERALIZED ABDOMINAL PAIN: ICD-10-CM

## 2024-01-18 DIAGNOSIS — K76.82 HEPATIC ENCEPHALOPATHY: ICD-10-CM

## 2024-01-18 DIAGNOSIS — K70.31 ALCOHOLIC CIRRHOSIS OF LIVER WITH ASCITES: ICD-10-CM

## 2024-01-18 PROBLEM — 420054005: Status: ACTIVE | Noted: 2024-01-18

## 2024-01-18 PROCEDURE — 99215 OFFICE O/P EST HI 40 MIN: CPT | Performed by: INTERNAL MEDICINE

## 2024-01-18 RX ORDER — SPIRONOLACTONE 50 MG/1
1 TABLET TABLET, FILM COATED ORAL ONCE A DAY
Qty: 30 | Refills: 1 | Status: ACTIVE | COMMUNITY
Start: 2023-12-15 | End: 2024-02-13

## 2024-01-18 RX ORDER — LISINOPRIL 2.5 MG/1
TAKE 1 TABLET BY MOUTH EVERY DAY TABLET ORAL
Qty: 90 EACH | Refills: 0 | Status: ACTIVE | COMMUNITY

## 2024-01-18 RX ORDER — LEVOTHYROXINE SODIUM 0.05 MG/1
TABLET ORAL
Qty: 90 TABLET | Refills: 0 | Status: ACTIVE | COMMUNITY

## 2024-01-18 RX ORDER — RIFAXIMIN 550 MG/1
1 TABLET TABLET ORAL TWICE A DAY
Qty: 60 | Refills: 1 | OUTPATIENT
Start: 2024-01-18 | End: 2024-03-18

## 2024-01-18 RX ORDER — OMEPRAZOLE 40 MG/1
TAKE 1 CAPSULE BY MOUTH TWICE A DAY 30 MINS BEFORE MEALS CAPSULE, DELAYED RELEASE ORAL
Qty: 60 CAPSULE | Refills: 3 | Status: ACTIVE | COMMUNITY

## 2024-01-18 RX ORDER — ALENDRONATE SODIUM 70 MG/1
TAKE 1 TABLET BY MOUTH ONCE WEEKLY TABLET ORAL
Qty: 12 EACH | Refills: 2 | Status: ACTIVE | COMMUNITY

## 2024-01-18 RX ORDER — AMOXICILLIN 500 MG/1
1 CAPSULE CAPSULE ORAL
Status: ACTIVE | COMMUNITY

## 2024-01-18 RX ORDER — CARVEDILOL 3.12 MG/1
TAKE 1 TABLET BY MOUTH TWICE DAILY WITH MEALS TABLET, FILM COATED ORAL
Qty: 180 EACH | Refills: 3 | Status: ACTIVE | COMMUNITY

## 2024-01-18 RX ORDER — FUROSEMIDE 20 MG/1
1 TABLET TABLET ORAL ONCE A DAY
Qty: 30 | Refills: 1 | Status: ACTIVE | COMMUNITY
Start: 2023-12-15

## 2024-01-18 RX ORDER — PANCRELIPASE 36000; 180000; 114000 [USP'U]/1; [USP'U]/1; [USP'U]/1
AS DIRECTED CAPSULE, DELAYED RELEASE PELLETS ORAL
Qty: 180 | Refills: 1 | OUTPATIENT
Start: 2024-01-18 | End: 2024-03-18

## 2024-01-18 RX ORDER — FOLIC ACID 1 MG/1
1 TABLET TABLET ORAL ONCE A DAY
Status: ACTIVE | COMMUNITY

## 2024-01-18 RX ORDER — LACTULOSE 10 G/15ML
15 ML AS NEEDED SOLUTION ORAL ONCE A DAY
Qty: 450 | OUTPATIENT
Start: 2024-01-18 | End: 2024-02-17

## 2024-01-18 RX ORDER — FLUOXETINE 20 MG/1
1 CAPSULE CAPSULE ORAL ONCE A DAY
Status: ACTIVE | COMMUNITY

## 2024-01-18 RX ORDER — AMITRIPTYLINE HYDROCHLORIDE 25 MG/1
TAKE 1 TO 2 TABLETS BY MOUTH AT BEDTIME TABLET, FILM COATED ORAL
Qty: 180 EACH | Refills: 2 | Status: ACTIVE | COMMUNITY

## 2024-01-18 NOTE — HPI-TODAY'S VISIT:
Yvette is a 63-year-old female that presents today for follow-up.  GI history significant for cirrhosis, chronic pancreatitis, common bile duct stricture.  Labs 12/2023 revealed elevated liver enzymes with bilirubin and alkaline phosphatase which were trending up.  CA 19-9 was mildly elevated at 47.  MRI revealed stricture in the common bile duct with upstream dilation of the common bile duct and pancreatic duct.  She was referred to [facility] gastroenterology for EUS and ERCP.  She underwent EUS and ERCP 12/26/2023.  Full report is summarized below but findings included chronic pancreatitis with distal common bile duct stricture.  Brushings were obtained from the distal common bile duct which were negative for malignancy.  She did have a plastic biliary stent placed.  She has not had repeat labs since her procedure.  Her last paracentesis was 12/21/2023.  A total of 1.3 L of fluid was removed.  She is currently taking spironolactone 50 mg daily and Lasix 20 mg daily.

## 2024-01-25 ENCOUNTER — TELEPHONE ENCOUNTER (OUTPATIENT)
Dept: URBAN - METROPOLITAN AREA CLINIC 63 | Facility: CLINIC | Age: 64
End: 2024-01-25

## 2024-01-25 LAB
A/G RATIO: 0.7
ALBUMIN: 3.4
ALKALINE PHOSPHATASE: 701
ALT (SGPT): 27
AST (SGOT): 63
BILIRUBIN, DIRECT: 1.2
BILIRUBIN, TOTAL: 2.3
BUN/CREATININE RATIO: 47
BUN: 36
CALCIUM: 9.1
CARBON DIOXIDE, TOTAL: 26
CHLORIDE: 97
CREATININE: 0.77
EGFR: 86
GLOBULIN, TOTAL: 5
GLUCOSE: 110
HEMATOCRIT: 34.5
HEMOGLOBIN: 12.1
MCH: 34.4
MCHC: 35.1
MCV: 98
MPV: 10.8
PLATELET COUNT: 190
POTASSIUM: 4.6
PROTEIN, TOTAL: 8.4
RDW: 11.6
RED BLOOD CELL COUNT: 3.52
SODIUM: 131
WHITE BLOOD CELL COUNT: 17.7

## 2024-01-31 ENCOUNTER — OFFICE VISIT (OUTPATIENT)
Dept: URBAN - METROPOLITAN AREA CLINIC 60 | Facility: CLINIC | Age: 64
End: 2024-01-31

## 2024-02-05 ENCOUNTER — LAB (OUTPATIENT)
Dept: URBAN - METROPOLITAN AREA CLINIC 60 | Facility: CLINIC | Age: 64
End: 2024-02-05

## 2024-03-20 ENCOUNTER — OV EP (OUTPATIENT)
Dept: URBAN - METROPOLITAN AREA CLINIC 60 | Facility: CLINIC | Age: 64
End: 2024-03-20

## 2024-06-06 ENCOUNTER — ERX REFILL RESPONSE (OUTPATIENT)
Dept: URBAN - METROPOLITAN AREA CLINIC 60 | Facility: CLINIC | Age: 64
End: 2024-06-06

## 2024-06-06 RX ORDER — OMEPRAZOLE 40 MG/1
TAKE 1 CAPSULE BY MOUTH TWICE A DAY 30 MINS BEFORE MEALS CAPSULE, DELAYED RELEASE ORAL
Qty: 60 CAPSULE | Refills: 1 | OUTPATIENT

## 2024-07-29 ENCOUNTER — TELEPHONE ENCOUNTER (OUTPATIENT)
Dept: URBAN - METROPOLITAN AREA CLINIC 63 | Facility: CLINIC | Age: 64
End: 2024-07-29

## 2024-07-29 RX ORDER — OMEPRAZOLE 40 MG/1
TAKE 1 CAPSULE BY MOUTH TWICE A DAY 30 MINS BEFORE MEALS CAPSULE, DELAYED RELEASE ORAL TWICE DAILY
Qty: 180 | Refills: 1

## 2025-02-16 ENCOUNTER — ERX REFILL RESPONSE (OUTPATIENT)
Dept: URBAN - METROPOLITAN AREA CLINIC 63 | Facility: CLINIC | Age: 65
End: 2025-02-16

## 2025-02-16 RX ORDER — OMEPRAZOLE 40 MG/1
TAKE 1 CAPSULE BY MOUTH TWICE A DAY 30 MINS BEFORE MEALS TWICE DAILY 90 DAYS CAPSULE, DELAYED RELEASE ORAL
Qty: 180 CAPSULE | Refills: 1 | OUTPATIENT

## 2025-02-16 RX ORDER — OMEPRAZOLE 40 MG/1
TAKE 1 CAPSULE BY MOUTH TWICE A DAY 30 MINS BEFORE MEALS CAPSULE, DELAYED RELEASE ORAL TWICE DAILY
Qty: 180 | Refills: 1 | OUTPATIENT

## 2025-08-10 ENCOUNTER — ERX REFILL RESPONSE (OUTPATIENT)
Dept: URBAN - METROPOLITAN AREA CLINIC 63 | Facility: CLINIC | Age: 65
End: 2025-08-10

## 2025-08-10 RX ORDER — OMEPRAZOLE 40 MG/1
TAKE 1 CAPSULE BY MOUTH TWICE A DAY 30 MINS BEFORE MEALS TWICE DAILY 90 DAYS CAPSULE, DELAYED RELEASE ORAL
Qty: 180 CAPSULE | Refills: 1